# Patient Record
Sex: FEMALE | Race: WHITE | NOT HISPANIC OR LATINO | URBAN - METROPOLITAN AREA
[De-identification: names, ages, dates, MRNs, and addresses within clinical notes are randomized per-mention and may not be internally consistent; named-entity substitution may affect disease eponyms.]

---

## 2023-04-20 ENCOUNTER — APPOINTMENT (RX ONLY)
Dept: URBAN - METROPOLITAN AREA CLINIC 38 | Facility: CLINIC | Age: 28
Setting detail: DERMATOLOGY
End: 2023-04-20

## 2023-04-20 DIAGNOSIS — L21.8 OTHER SEBORRHEIC DERMATITIS: ICD-10-CM

## 2023-04-20 DIAGNOSIS — L50.1 IDIOPATHIC URTICARIA: ICD-10-CM

## 2023-04-20 PROCEDURE — ? COUNSELING

## 2023-04-20 PROCEDURE — ? PRESCRIPTION

## 2023-04-20 PROCEDURE — 99203 OFFICE O/P NEW LOW 30 MIN: CPT

## 2023-04-20 PROCEDURE — ? ADDITIONAL NOTES

## 2023-04-20 RX ORDER — KETOCONAZOLE 20 MG/ML
SHAMPOO, SUSPENSION TOPICAL
Qty: 120 | Refills: 5 | Status: ERX | COMMUNITY
Start: 2023-04-20

## 2023-04-20 RX ORDER — HYDROXYZINE HYDROCHLORIDE 25 MG/1
TABLET, FILM COATED ORAL
Qty: 14 | Refills: 0 | Status: ERX | COMMUNITY
Start: 2023-04-20

## 2023-04-20 RX ORDER — LEVOCETIRIZINE DIHYDROCHLORIDE 5 MG
TABLET ORAL
Qty: 14 | Refills: 0 | Status: ERX | COMMUNITY
Start: 2023-04-20

## 2023-04-20 RX ADMIN — KETOCONAZOLE: 20 SHAMPOO, SUSPENSION TOPICAL at 00:00

## 2023-04-20 RX ADMIN — Medication: at 00:00

## 2023-04-20 RX ADMIN — HYDROXYZINE HYDROCHLORIDE: 25 TABLET, FILM COATED ORAL at 00:00

## 2023-04-20 ASSESSMENT — LOCATION SIMPLE DESCRIPTION DERM
LOCATION SIMPLE: ANTERIOR SCALP
LOCATION SIMPLE: ABDOMEN
LOCATION SIMPLE: LEFT UPPER BACK
LOCATION SIMPLE: RIGHT OCCIPITAL SCALP
LOCATION SIMPLE: RIGHT POSTERIOR THIGH

## 2023-04-20 ASSESSMENT — LOCATION ZONE DERM
LOCATION ZONE: TRUNK
LOCATION ZONE: LEG
LOCATION ZONE: SCALP

## 2023-04-20 ASSESSMENT — LOCATION DETAILED DESCRIPTION DERM
LOCATION DETAILED: PERIUMBILICAL SKIN
LOCATION DETAILED: RIGHT DISTAL POSTERIOR THIGH
LOCATION DETAILED: LEFT SUPERIOR MEDIAL UPPER BACK
LOCATION DETAILED: MID-FRONTAL SCALP
LOCATION DETAILED: RIGHT SUPERIOR OCCIPITAL SCALP

## 2023-04-20 NOTE — HPI: RASH
What Type Of Note Output Would You Prefer (Optional)?: Standard Output
How Severe Is Your Rash?: moderate
Is This A New Presentation, Or A Follow-Up?: Rash
Additional History: Pt stated the rash first started on her eyelids but then begin to spread on her body.